# Patient Record
Sex: FEMALE | URBAN - METROPOLITAN AREA
[De-identification: names, ages, dates, MRNs, and addresses within clinical notes are randomized per-mention and may not be internally consistent; named-entity substitution may affect disease eponyms.]

---

## 2018-04-10 ENCOUNTER — TELEPHONE (OUTPATIENT)
Dept: NUTRITION | Age: 68
End: 2018-04-10

## 2018-04-10 NOTE — TELEPHONE ENCOUNTER
Nutrition Counseling: Called pt and left voicemail with insurance coverage information for nutrition counseling program. Encouraged pt to call back with questions and if she would like to schedule appt.     Rj Cain, [address] [place] [address],   Outpatient Dietitian  Office: [phone]  Cell: [phone]

## 2018-04-25 ENCOUNTER — DOCUMENTATION ONLY (OUTPATIENT)
Dept: NUTRITION | Age: 68
End: 2018-04-25

## 2018-04-25 NOTE — PROGRESS NOTES
Nutrition Counseling:  Pt has not returned calls/contacted RD further to schedule appt. Will close referral for this office.     Brayden Morris, [address],   Outpatient Dietitian  Office: [phone]  Cell: [phone]